# Patient Record
Sex: FEMALE | Employment: STUDENT | URBAN - METROPOLITAN AREA
[De-identification: names, ages, dates, MRNs, and addresses within clinical notes are randomized per-mention and may not be internally consistent; named-entity substitution may affect disease eponyms.]

---

## 2020-01-03 ENCOUNTER — APPOINTMENT (OUTPATIENT)
Dept: OTHER | Facility: HOSPITAL | Age: 20
End: 2020-01-03
Attending: PREVENTIVE MEDICINE

## 2020-01-10 ENCOUNTER — APPOINTMENT (OUTPATIENT)
Dept: OTHER | Facility: HOSPITAL | Age: 20
End: 2020-01-10
Attending: PREVENTIVE MEDICINE

## 2025-04-21 ENCOUNTER — APPOINTMENT (OUTPATIENT)
Dept: URBAN - METROPOLITAN AREA CLINIC 249 | Age: 25
Setting detail: DERMATOLOGY
End: 2025-04-22

## 2025-04-21 VITALS — HEIGHT: 65 IN | WEIGHT: 150 LBS

## 2025-04-21 DIAGNOSIS — Z41.1 ENCOUNTER FOR COSMETIC SURGERY: ICD-10-CM

## 2025-04-21 PROCEDURE — OTHER CONSULTATION - BREAST (COSMETIC): OTHER

## 2025-04-21 PROCEDURE — OTHER CONSULTATION - ABDOMINOPLASTY: OTHER

## 2025-04-21 NOTE — PROCEDURE: CONSULTATION - ABDOMINOPLASTY
Count Minor/Major Decisions Toward Mdm (Not Cosmetic)?: No
Consultation Charge $ (Use Numbers Only, No Text Please.): 200.00
Detail Level: Detailed